# Patient Record
Sex: MALE | Race: ASIAN | NOT HISPANIC OR LATINO | Employment: FULL TIME | URBAN - METROPOLITAN AREA
[De-identification: names, ages, dates, MRNs, and addresses within clinical notes are randomized per-mention and may not be internally consistent; named-entity substitution may affect disease eponyms.]

---

## 2020-06-17 ENCOUNTER — OFFICE VISIT - HEALTHEAST (OUTPATIENT)
Dept: FAMILY MEDICINE | Facility: CLINIC | Age: 30
End: 2020-06-17

## 2020-06-17 DIAGNOSIS — R07.89 ATYPICAL CHEST PAIN: ICD-10-CM

## 2020-07-27 ENCOUNTER — OFFICE VISIT - HEALTHEAST (OUTPATIENT)
Dept: FAMILY MEDICINE | Facility: CLINIC | Age: 30
End: 2020-07-27

## 2020-07-27 DIAGNOSIS — Z00.00 PHYSICAL EXAM: ICD-10-CM

## 2020-07-27 DIAGNOSIS — R07.89 ATYPICAL CHEST PAIN: ICD-10-CM

## 2020-07-27 DIAGNOSIS — Z13.220 SCREENING FOR LIPID DISORDERS: ICD-10-CM

## 2020-07-27 DIAGNOSIS — Z23 NEED FOR VACCINATION: ICD-10-CM

## 2020-07-27 LAB
CHOLEST SERPL-MCNC: 226 MG/DL
FASTING STATUS PATIENT QL REPORTED: YES
FASTING STATUS PATIENT QL REPORTED: YES
GLUCOSE BLD-MCNC: 93 MG/DL (ref 70–125)
HDLC SERPL-MCNC: 47 MG/DL
LDLC SERPL CALC-MCNC: 162 MG/DL
TRIGL SERPL-MCNC: 85 MG/DL

## 2020-07-27 RX ORDER — IBUPROFEN 800 MG/1
TABLET, FILM COATED ORAL
Status: SHIPPED | COMMUNITY
Start: 2020-07-23 | End: 2023-08-17

## 2020-07-27 RX ORDER — LORATADINE 10 MG/1
TABLET ORAL
Status: SHIPPED | COMMUNITY
Start: 2020-07-23 | End: 2023-08-17

## 2020-07-27 RX ORDER — AMOXICILLIN 500 MG/1
CAPSULE ORAL
Status: SHIPPED | COMMUNITY
Start: 2020-07-23 | End: 2023-08-17

## 2020-07-27 ASSESSMENT — MIFFLIN-ST. JEOR: SCORE: 1913.25

## 2020-07-28 ENCOUNTER — COMMUNICATION - HEALTHEAST (OUTPATIENT)
Dept: FAMILY MEDICINE | Facility: CLINIC | Age: 30
End: 2020-07-28

## 2021-06-04 VITALS — WEIGHT: 190 LBS

## 2021-06-04 VITALS
HEIGHT: 69 IN | SYSTOLIC BLOOD PRESSURE: 138 MMHG | DIASTOLIC BLOOD PRESSURE: 90 MMHG | BODY MASS INDEX: 31.15 KG/M2 | HEART RATE: 73 BPM | WEIGHT: 210.3 LBS

## 2021-06-08 NOTE — PROGRESS NOTES
"Geovanni Shook is a 30 y.o. male who is being evaluated via a billable video visit.      The patient has been notified of following:     \"This video visit will be conducted via a call between you and your physician/provider. We have found that certain health care needs can be provided without the need for an in-person physical exam.  This service lets us provide the care you need with a video conversation.  If a prescription is necessary we can send it directly to your pharmacy.  If lab work is needed we can place an order for that and you can then stop by our lab to have the test done at a later time.    Video visits are billed at different rates depending on your insurance coverage. Please reach out to your insurance provider with any questions.    If during the course of the call the physician/provider feels a video visit is not appropriate, you will not be charged for this service.\"    Patient has given verbal consent to a Video visit? Yes    How would you like to obtain your AVS? AVS Preference: Mail a copy.  Patient would like the video invitation sent by: Other e-mail: [email or web address]    Will anyone else be joining your video visit? No        Video Start Time: 10:11 AM    Assessment/Plan:   1. Atypical chest pain  Patient history and exam consistent with non-cardiac cause of chest pain.  Conservative measures indicated. I suspect chest pressure likely secondary to acid reflux, recommend tying TUMS and adjusting food by reducing acidic food. Patient will follow up in a week if symptom persist or worsens.     Subjective:   Geovanni Shook is a 30 y.o. male who presents for evaluation of chest pain. Onset was 2.5 weeks ago. Symptoms have been unchanged since that time. The patient describes the pain as pressure and does not radiate. Patient rates pain as a 2/10 in intensity. Associated symptoms are: none. Aggravating factors are: any movement. Alleviating factors are: none. Patient's cardiac risk factors are: none. " Patient's risk factors for DVT/PE: none. Previous cardiac testing: none.    The following portions of the patient's history were reviewed and updated as appropriate: allergies, current medications, past family history, past medical history, past social history, past surgical history and problem list.    Review of Systems  A 12 point comprehensive review of systems was negative except as noted.      Additional provider notes: GENERAL: Healthy, alert and no distress  EYES: Eyes grossly normal to inspection. No discharge or erythema, or obvious scleral/conjunctival abnormalities.  RESP: No audible wheeze, cough, or visible cyanosis.  No visible retractions or increased work of breathing.    NEURO: Cranial nerves grossly intact. Mentation and speech appropriate for age.  PSYCH: Mentation appears normal, affect normal/bright, judgement and insight intact, normal speech and appearance well-groomed              Video-Visit Details    Type of service:  Video Visit    Video End Time (time video stopped): 10:24 AM  Originating Location (pt. Location): Home    Distant Location (provider location):  [facility]     Platform used for Video Visit: FAUZIA Prater

## 2021-06-16 PROBLEM — E78.5 HYPERLIPIDEMIA: Status: ACTIVE | Noted: 2020-07-28

## 2021-06-20 NOTE — LETTER
Letter by Oz Rodriguez MD at      Author: Oz Rodriguez MD Service: -- Author Type: --    Filed:  Encounter Date: 7/28/2020 Status: (Other)         Geovanni Shook  [address] [facility] [address]             July 28, 2020         Dear Mr. Shook,    Below are the results from your recent visit: Sugar is good at 93, no diabetes.  The total and LDL cholesterol are slightly elevated, but focus on good diet and exercise to help lower.    Resulted Orders   Glucose   Result Value Ref Range    Glucose 93 70 - 125 mg/dL    Patient Fasting > 8hrs? Yes     Narrative    Fasting Glucose reference range is 70-99 mg/dL per  American Diabetes Association (ADA) guidelines.   Lipid Cascade   Result Value Ref Range    Cholesterol 226 (H) <=199 mg/dL    Triglycerides 85 <=149 mg/dL    HDL Cholesterol 47 >=40 mg/dL    LDL Calculated 162 (H) <=129 mg/dL    Patient Fasting > 8hrs? Yes             Please call with questions or contact us using [email or web address].    Sincerely,        Electronically signed by Oz Rodriguez MD

## 2021-06-27 ENCOUNTER — HEALTH MAINTENANCE LETTER (OUTPATIENT)
Age: 31
End: 2021-06-27

## 2021-06-29 NOTE — PROGRESS NOTES
Progress Notes by Oz Rodriguez MD at 7/27/2020  2:30 PM     Author: Oz Rodriguez MD Service: -- Author Type: Physician    Filed: 7/27/2020  3:32 PM Encounter Date: 7/27/2020 Status: Signed    : Oz Rodriguez MD (Physician)       MALE PREVENTATIVE EXAM    Assessment and Plan:       1. Physical exam  The patient overall has very good health habits.    2. Atypical chest pain  4 to 6 weeks of intermittent recurrent diffuse anterior chest wall pain, I suspect a benign etiology    3. Need for vaccination     - Tdap vaccine,  6yo or older,  IM    4. Screening for lipid disorders  No family history of diabetes or lipid disorders  - Glucose  - Lipid Cascade    PLAN:  1.  Tdap  2.  Laboratory studies as above.  3.  Overall reassurance about the chest wall pain I suspect that this is benign and likely will resolve spontaneously.  4.  Patient should be seen every 1 to 2 years        Next follow up:  No follow-ups on file.    Immunization Review  Adult Imm Review: Due today, orders placed      I discussed the following with the patient:   Adult Healthy Living: Importance of regular exercise  Healthy nutrition        Subjective:   Chief Complaint: Geovanni Shook is an 30 y.o. male here for a preventative health visit.     HPI: 30-year-old male new to the clinic here for history and physical examination.    Patient reports for about 4 to 6 weeks he has had intermittent recurrent diffuse pain or tenderness may be pressure anterior chest wall left-sided but right-sided as well.  He had a virtual visit last month he was recommended to take Tums which he did take some but did not think that made really much of a difference.  There is no association between activity exercise diet really anything else that he can think of.  Patient reports that when he has this discomfort it does not in any way interfere with function, I told the patient I suspect some type of benign explanation or etiology.    Patient otherwise has very  "good health habits, he has never been a smoker drinks a few alcoholic beverages weekly he does have some regular exercise, he really has no underlying medical issues.  He is recently had some dental work.    Reviewed the patient's allergies, medications active medical problems past medical history past surgical history family history and social history.    Healthy Habits  Are you taking a daily aspirin? No  Do you typically exercising at least 40 min, 3-4 times per week?  Yes  Do you usually eat at least 4 servings of fruit and vegetables a day, include whole grains and fiber and avoid regularly eating high fat foods? Yes  Have you had an eye exam in the past two years? Yes  Do you see a dentist twice per year? Yes  Do you have any concerns regarding sleep? No    Safety Screen  If you own firearms, are they secured in a locked gun cabinet or with trigger locks? The patient does not own any firearms  Do you feel you are safe where you are living?: Yes (7/27/2020  2:23 PM)  Do you feel you are safe in your relationship(s)?: Yes (7/27/2020  2:23 PM)      Review of Systems:  Please see above.  The rest of the review of systems are negative for all systems.     Cancer Screening     Patient has no health maintenance due at this time          Patient Care Team:  Provider, No Primary Care as PCP - General        History     Reviewed By Date/Time Sections Reviewed    Oz Rodriguez MD 7/27/2020  2:40 PM Medical, Surgical, Tobacco, Alcohol, Drug Use, Sexual Activity, Family, Social Documentation, Socioeconomic, Lifestyle, Relationships    Brenda De Santiago CMA 7/27/2020  2:29 PM Tobacco            Objective:   Vital Signs:   Visit Vitals  /90   Pulse 73   Ht 5' 9.25\" (1.759 m)   Wt 210 lb 4.8 oz (95.4 kg)   BMI 30.83 kg/m           PHYSICAL EXAM  Physical Exam   Constitutional: He is oriented to person, place, and time. He appears well-developed and well-nourished.   HENT:   Head: Normocephalic and atraumatic. "   Right Ear: External ear normal.   Eyes: Pupils are equal, round, and reactive to light. Conjunctivae are normal.   Cardiovascular: Normal rate, regular rhythm and normal heart sounds.   Pulmonary/Chest: Effort normal and breath sounds normal.   Musculoskeletal: Normal range of motion.   Neurological: He is alert and oriented to person, place, and time.   Skin: Skin is warm and dry.   Psychiatric: He has a normal mood and affect. His behavior is normal. Judgment and thought content normal.                Medication List          Accurate as of July 27, 2020  2:58 PM. If you have any questions, ask your nurse or doctor.            CONTINUE taking these medications    amoxicillin 500 MG capsule  Also known as:  AMOXIL        Arthritis Pain Relief (acetam) 650 MG CR tablet  Generic drug:  acetaminophen        ibuprofen 800 MG tablet  Also known as:  ADVILMOTRIN               Additional Screenings Completed Today:

## 2021-10-17 ENCOUNTER — HEALTH MAINTENANCE LETTER (OUTPATIENT)
Age: 31
End: 2021-10-17

## 2022-01-24 ENCOUNTER — OFFICE VISIT (OUTPATIENT)
Dept: FAMILY MEDICINE | Facility: CLINIC | Age: 32
End: 2022-01-24
Payer: COMMERCIAL

## 2022-01-24 VITALS
WEIGHT: 215 LBS | DIASTOLIC BLOOD PRESSURE: 92 MMHG | SYSTOLIC BLOOD PRESSURE: 134 MMHG | OXYGEN SATURATION: 98 % | BODY MASS INDEX: 31.52 KG/M2 | HEART RATE: 64 BPM

## 2022-01-24 DIAGNOSIS — R19.8 PAIN ASSOCIATED WITH DEFECATION: Primary | ICD-10-CM

## 2022-01-24 PROCEDURE — 99213 OFFICE O/P EST LOW 20 MIN: CPT | Performed by: FAMILY MEDICINE

## 2022-01-24 RX ORDER — HYDROCORTISONE 25 MG/G
CREAM TOPICAL 2 TIMES DAILY PRN
Qty: 30 G | Refills: 1 | Status: SHIPPED | OUTPATIENT
Start: 2022-01-24 | End: 2023-08-31

## 2022-01-24 NOTE — PROGRESS NOTES
ASSESSMENT:  (R19.8) Pain associated with defecation  (primary encounter diagnosis)  Comment: I suspect this is related to both a small anal fissure, and some external hemorrhoids.  Plan: hydrocortisone, Perianal, (HYDROCORTISONE) 2.5         % cream                 PLAN:  1.  Continue supplemental fiber  2.  Add Anusol cream up to twice a day as needed discussed that I would want him to do this after each bowel movement  3.  If despite the above the patient is still having problems or difficulties with then consider referral to a general surgeon.  4.  Patient is due for a physical later in the year.       No orders of the defined types were placed in this encounter.    There are no discontinued medications.    No follow-ups on file.    CHIEF COMPLAINT:  chief complaint    SUBJECTIVE:  Geovanni is a 31 year old male who comes in because for about the past month or so he has had pain with and after defecation.  This is a new symptom for him.  This began when he was constipated but since that time he has not been constipated and he is taking some supplemental fiber.  He has not noticed any blood in her on his stool.    The patient otherwise has no other change in his health status, appetite remains good his diet has not changed.    I discussed with the patient that I suspect that he has a small anal fissure, and also I suspect that some hemorrhoids which I can note on examination are also playing a role and will try to manage this conservatively.      REVIEW OF SYSTEMS:      All other systems are negative.    PFSH:  Immunization History   Administered Date(s) Administered     COVID-19,PF,Pfizer (12+ Yrs) 09/22/2021, 10/13/2021     Tdap (Adacel,Boostrix) 07/27/2020     Social History     Socioeconomic History     Marital status: Single     Spouse name: Not on file     Number of children: 0     Years of education: Not on file     Highest education level: Not on file   Occupational History     Not on file   Tobacco Use      Smoking status: Never Smoker     Smokeless tobacco: Never Used   Substance and Sexual Activity     Alcohol use: Yes     Alcohol/week: 2.0 standard drinks     Drug use: Not on file     Sexual activity: Not on file   Other Topics Concern     Not on file   Social History Narrative    Diet-regular    Exercise-volleyball, run     Social Determinants of Health     Financial Resource Strain: Not on file   Food Insecurity: Not on file   Transportation Needs: Not on file   Physical Activity: Not on file   Stress: Not on file   Social Connections: Not on file   Intimate Partner Violence: Not on file   Housing Stability: Not on file     No past medical history on file.  No family history on file.    MEDICATIONS:  Current Outpatient Medications   Medication Sig Dispense Refill     amoxicillin (AMOXIL) 500 MG capsule [AMOXICILLIN (AMOXIL) 500 MG CAPSULE]        ARTHRITIS PAIN RELIEF, ACETAM, 650 mg CR tablet [ARTHRITIS PAIN RELIEF, ACETAM, 650 MG CR TABLET]        ibuprofen (ADVIL,MOTRIN) 800 MG tablet [IBUPROFEN (ADVIL,MOTRIN) 800 MG TABLET]          TOBACCO USE:  History   Smoking Status     Never Smoker   Smokeless Tobacco     Never Used       VITALS:  There were no vitals filed for this visit.  Wt Readings from Last 3 Encounters:   07/27/20 95.4 kg (210 lb 4.8 oz)   06/17/20 86.2 kg (190 lb)       PHYSICAL EXAM:  Constitutional:   Reveals a male who appears his stated age.  Vitals: per nursing notes.  Eyes:  EOMs full, PERRL.  Musculoskeletal: No peripheral swelling.  Neuro:  Alert and oriented. Cranial nerves, motor, sensory exams are intact.  No gross focal deficits.  Psychiatric:  Memory intact, mood appropriate.  Rectal examination.  There are some nonthrombosed just slightly tender external hemorrhoids that are not bleeding at this time, there is some reproducible tenderness with entry into the anal canal.    QUALITY MEASURES:      DATA REVIEWED:

## 2022-10-02 ENCOUNTER — HEALTH MAINTENANCE LETTER (OUTPATIENT)
Age: 32
End: 2022-10-02

## 2023-02-11 ENCOUNTER — HEALTH MAINTENANCE LETTER (OUTPATIENT)
Age: 33
End: 2023-02-11

## 2023-08-16 ASSESSMENT — PATIENT HEALTH QUESTIONNAIRE - PHQ9
10. IF YOU CHECKED OFF ANY PROBLEMS, HOW DIFFICULT HAVE THESE PROBLEMS MADE IT FOR YOU TO DO YOUR WORK, TAKE CARE OF THINGS AT HOME, OR GET ALONG WITH OTHER PEOPLE: VERY DIFFICULT
SUM OF ALL RESPONSES TO PHQ QUESTIONS 1-9: 4
SUM OF ALL RESPONSES TO PHQ QUESTIONS 1-9: 4

## 2023-08-17 ENCOUNTER — OFFICE VISIT (OUTPATIENT)
Dept: FAMILY MEDICINE | Facility: CLINIC | Age: 33
End: 2023-08-17
Payer: COMMERCIAL

## 2023-08-17 VITALS
RESPIRATION RATE: 19 BRPM | TEMPERATURE: 98 F | DIASTOLIC BLOOD PRESSURE: 77 MMHG | HEIGHT: 69 IN | WEIGHT: 225 LBS | OXYGEN SATURATION: 99 % | HEART RATE: 66 BPM | SYSTOLIC BLOOD PRESSURE: 118 MMHG | BODY MASS INDEX: 33.33 KG/M2

## 2023-08-17 DIAGNOSIS — Z11.59 NEED FOR HEPATITIS C SCREENING TEST: ICD-10-CM

## 2023-08-17 DIAGNOSIS — K64.4 EXTERNAL HEMORRHOIDS: ICD-10-CM

## 2023-08-17 DIAGNOSIS — Z11.4 SCREENING FOR HIV (HUMAN IMMUNODEFICIENCY VIRUS): Primary | ICD-10-CM

## 2023-08-17 PROCEDURE — 99213 OFFICE O/P EST LOW 20 MIN: CPT | Performed by: FAMILY MEDICINE

## 2023-08-17 RX ORDER — HYDROCORTISONE 25 MG/G
CREAM TOPICAL 2 TIMES DAILY PRN
Qty: 30 G | Refills: 1 | Status: SHIPPED | OUTPATIENT
Start: 2023-08-17 | End: 2023-09-21

## 2023-08-17 NOTE — PROGRESS NOTES
"  Assessment & Plan       (K64.4) External hemorrhoids  Comment: Symptomatic  Plan: hydrocortisone, Perianal, (HYDROCORTISONE) 2.5         % cream             PLAN:  Anusol cream restarted  Patient also will try to get adequate fiber from his diet but will add supplemental fiber as needed  Patient will be seen in the near future for physical             BMI:   Estimated body mass index is 32.99 kg/m  as calculated from the following:    Height as of this encounter: 1.759 m (5' 9.25\").    Weight as of this encounter: 102.1 kg (225 lb).           MD TONY [facility]    Mercedes Galarza is a 33 year old, presenting for the following health issues:  Patient comes in because of some rectal pain that he is having.  Of note I saw the patient actually for the same issue in January 2022 on exam he had an external hemorrhoid I treated this with Anusol cream which was successful, but in the last week or so he is having some pain with bowel movements but no bleeding.    Patient was tried some over-the-counter remedy or ointment though he has not found that as helpful or effective.  Patient does note that he eats a diet that is more rich in fruits vegetables he has more well-formed and not harder stools, his stools have been a bit harder and he has been adding some fiber.    I discussed with the patient that I will restart him on Anusol cream, try to get as much fiber from his diet but if necessary add dietary supplement        Rectal Pain for a week after bowel movement         8/17/2023     9:37 AM   Additional Questions   Roomed by LUZ Dinh       History of Present Illness       Reason for visit:  Hemorrhoids    He eats 0-1 servings of fruits and vegetables daily.He consumes 0 sweetened beverage(s) daily.He exercises with enough effort to increase his heart rate 30 to 60 minutes per day.  He exercises with enough effort to increase his heart rate 3 or less days per week. " "  He is taking medications regularly.               Review of Systems         Objective    /77   Pulse 66   Temp 98  F (36.7  C) (Temporal)   Resp 19   Ht 1.759 m (5' 9.25\")   Wt 102.1 kg (225 lb)   SpO2 99%   BMI 32.99 kg/m    Body mass index is 32.99 kg/m .  Physical Exam                         "

## 2023-08-30 ASSESSMENT — ENCOUNTER SYMPTOMS
SHORTNESS OF BREATH: 0
JOINT SWELLING: 0
WEAKNESS: 0
HEMATOCHEZIA: 0
COUGH: 0
PALPITATIONS: 0
PARESTHESIAS: 0
NERVOUS/ANXIOUS: 0
SORE THROAT: 0
HEARTBURN: 0
CONSTIPATION: 0
HEADACHES: 0
NAUSEA: 0
FEVER: 0
EYE PAIN: 0
DYSURIA: 0
MYALGIAS: 0
DIZZINESS: 0
ARTHRALGIAS: 1
DIARRHEA: 0
ABDOMINAL PAIN: 0
HEMATURIA: 0
CHILLS: 0
FREQUENCY: 0

## 2023-08-31 ENCOUNTER — OFFICE VISIT (OUTPATIENT)
Dept: FAMILY MEDICINE | Facility: CLINIC | Age: 33
End: 2023-08-31
Payer: COMMERCIAL

## 2023-08-31 VITALS
RESPIRATION RATE: 16 BRPM | BODY MASS INDEX: 33.62 KG/M2 | TEMPERATURE: 98.2 F | OXYGEN SATURATION: 99 % | HEART RATE: 64 BPM | DIASTOLIC BLOOD PRESSURE: 84 MMHG | HEIGHT: 69 IN | SYSTOLIC BLOOD PRESSURE: 112 MMHG | WEIGHT: 227 LBS

## 2023-08-31 DIAGNOSIS — H91.93 BILATERAL HEARING LOSS, UNSPECIFIED HEARING LOSS TYPE: ICD-10-CM

## 2023-08-31 DIAGNOSIS — Z00.00 PHYSICAL EXAM: ICD-10-CM

## 2023-08-31 DIAGNOSIS — K64.4 EXTERNAL HEMORRHOIDS: ICD-10-CM

## 2023-08-31 DIAGNOSIS — E78.2 MIXED HYPERLIPIDEMIA: ICD-10-CM

## 2023-08-31 DIAGNOSIS — Z11.4 SCREENING FOR HIV (HUMAN IMMUNODEFICIENCY VIRUS): Primary | ICD-10-CM

## 2023-08-31 DIAGNOSIS — Z11.59 NEED FOR HEPATITIS C SCREENING TEST: ICD-10-CM

## 2023-08-31 PROBLEM — H91.90 HEARING LOSS: Status: ACTIVE | Noted: 2023-08-31

## 2023-08-31 LAB
ALBUMIN SERPL BCG-MCNC: 4.6 G/DL (ref 3.5–5.2)
ALP SERPL-CCNC: 57 U/L (ref 40–129)
ALT SERPL W P-5'-P-CCNC: 53 U/L (ref 0–70)
ANION GAP SERPL CALCULATED.3IONS-SCNC: 13 MMOL/L (ref 7–15)
AST SERPL W P-5'-P-CCNC: 45 U/L (ref 0–45)
BILIRUB SERPL-MCNC: 0.8 MG/DL
BUN SERPL-MCNC: 13.9 MG/DL (ref 6–20)
CALCIUM SERPL-MCNC: 9.2 MG/DL (ref 8.6–10)
CHLORIDE SERPL-SCNC: 103 MMOL/L (ref 98–107)
CHOLEST SERPL-MCNC: 242 MG/DL
CREAT SERPL-MCNC: 1.09 MG/DL (ref 0.67–1.17)
DEPRECATED HCO3 PLAS-SCNC: 24 MMOL/L (ref 22–29)
GFR SERPL CREATININE-BSD FRML MDRD: >90 ML/MIN/1.73M2
GLUCOSE SERPL-MCNC: 97 MG/DL (ref 70–99)
HDLC SERPL-MCNC: 49 MG/DL
LDLC SERPL CALC-MCNC: 160 MG/DL
NONHDLC SERPL-MCNC: 193 MG/DL
POTASSIUM SERPL-SCNC: 4.1 MMOL/L (ref 3.4–5.3)
PROT SERPL-MCNC: 7.5 G/DL (ref 6.4–8.3)
SODIUM SERPL-SCNC: 140 MMOL/L (ref 136–145)
TRIGL SERPL-MCNC: 163 MG/DL

## 2023-08-31 PROCEDURE — 99395 PREV VISIT EST AGE 18-39: CPT | Performed by: FAMILY MEDICINE

## 2023-08-31 PROCEDURE — 80061 LIPID PANEL: CPT | Performed by: FAMILY MEDICINE

## 2023-08-31 PROCEDURE — 36415 COLL VENOUS BLD VENIPUNCTURE: CPT | Performed by: FAMILY MEDICINE

## 2023-08-31 PROCEDURE — 99213 OFFICE O/P EST LOW 20 MIN: CPT | Mod: 25 | Performed by: FAMILY MEDICINE

## 2023-08-31 PROCEDURE — 80053 COMPREHEN METABOLIC PANEL: CPT | Performed by: FAMILY MEDICINE

## 2023-08-31 ASSESSMENT — ENCOUNTER SYMPTOMS
MYALGIAS: 0
JOINT SWELLING: 0
PARESTHESIAS: 0
DYSURIA: 0
ARTHRALGIAS: 1
HEADACHES: 0
EYE PAIN: 0
HEARTBURN: 0
DIZZINESS: 0
NERVOUS/ANXIOUS: 0
HEMATURIA: 0
SORE THROAT: 0
WEAKNESS: 0
CHILLS: 0
DIARRHEA: 0
FREQUENCY: 0
SHORTNESS OF BREATH: 0
ABDOMINAL PAIN: 0
HEMATOCHEZIA: 0
CONSTIPATION: 0
FEVER: 0
COUGH: 0
PALPITATIONS: 0
NAUSEA: 0

## 2023-08-31 NOTE — LETTER
September 1, 2023      Geovanni Shook  [address] [place] [address]        Dear ,    We are writing to inform you of your test results. Sugar is good at 97, you do not have diabetes.  Liver and kidney tests are normal.  Just a slight elevation of cholesterol but focus on good diet to help control.    See us again in one year.        Resulted Orders   Comprehensive metabolic panel   Result Value Ref Range    Sodium 140 136 - 145 mmol/L    Potassium 4.1 3.4 - 5.3 mmol/L    Chloride 103 98 - 107 mmol/L    Carbon Dioxide (CO2) 24 22 - 29 mmol/L    Anion Gap 13 7 - 15 mmol/L    Urea Nitrogen 13.9 6.0 - 20.0 mg/dL    Creatinine 1.09 0.67 - 1.17 mg/dL    Calcium 9.2 8.6 - 10.0 mg/dL    Glucose 97 70 - 99 mg/dL    Alkaline Phosphatase 57 40 - 129 U/L    AST 45 0 - 45 U/L      Comment:      Reference intervals for this test were updated on 6/12/2023 to more accurately reflect our healthy population. There may be differences in the flagging of prior results with similar values performed with this method. Interpretation of those prior results can be made in the context of the updated reference intervals.    ALT 53 0 - 70 U/L      Comment:      Reference intervals for this test were updated on 6/12/2023 to more accurately reflect our healthy population. There may be differences in the flagging of prior results with similar values performed with this method. Interpretation of those prior results can be made in the context of the updated reference intervals.      Protein Total 7.5 6.4 - 8.3 g/dL    Albumin 4.6 3.5 - 5.2 g/dL    Bilirubin Total 0.8 <=1.2 mg/dL    GFR Estimate >90 >60 mL/min/1.73m2   Lipid panel reflex to direct LDL Fasting   Result Value Ref Range    Cholesterol 242 (H) <200 mg/dL    Triglycerides 163 (H) <150 mg/dL    Direct Measure HDL 49 >=40 mg/dL    LDL Cholesterol Calculated 160 (H) <=100 mg/dL    Non HDL Cholesterol 193 (H) <130 mg/dL    Narrative    Cholesterol  Desirable:  <200  mg/dL    Triglycerides  Normal:  Less than 150 mg/dL  Borderline High:  150-199 mg/dL  High:  200-499 mg/dL  Very High:  Greater than or equal to 500 mg/dL    Direct Measure HDL  Female:  Greater than or equal to 50 mg/dL   Male:  Greater than or equal to 40 mg/dL    LDL Cholesterol  Desirable:  <100mg/dL  Above Desirable:  100-129 mg/dL   Borderline High:  130-159 mg/dL   High:  160-189 mg/dL   Very High:  >= 190 mg/dL    Non HDL Cholesterol  Desirable:  130 mg/dL  Above Desirable:  130-159 mg/dL  Borderline High:  160-189 mg/dL  High:  190-219 mg/dL  Very High:  Greater than or equal to 220 mg/dL       If you have any questions or concerns, please call the clinic at the number listed above.       Sincerely,      Oz Rodriguez MD

## 2023-08-31 NOTE — PROGRESS NOTES
SUBJECTIVE:   CC: Geovanni is an 33 year old who presents for preventative health visit.       8/31/2023    10:28 AM   Additional Questions   Roomed by Kanika TENORIO       HPI:  Patient comes in for his annual physical examination.  I saw the patient recently for an exacerbation of his external hemorrhoids he used Anusol cream symptoms have really resolved he does not typically have issues with straining or constipation    He has had issues with external hemorrhoids going back quite a number of years and they do occasionally flareup.    Patient has a history of hyperlipidemia attempting to control with lifestyle    Patient has a genetic hearing loss, he has hearing aids in and they have been helpful.    Overall the patient has good health habits.    Typically the patient does not get regular vaccinations, usually does not get the flu shot, he is equivocal about the new COVID-vaccine when available.          Healthy Habits:     Getting at least 3 servings of Calcium per day:  Yes    Bi-annual eye exam:  Yes    Dental care twice a year:  Yes    Sleep apnea or symptoms of sleep apnea:  None    Diet:  Regular (no restrictions)    Frequency of exercise:  2-3 days/week    Duration of exercise:  30-45 minutes    Taking medications regularly:  Yes    Medication side effects:  Not applicable    Additional concerns today:  Yes               Have you ever done Advance Care Planning? (For example, a Health Directive, POLST, or a discussion with a medical provider or your loved ones about your wishes): No, advance care planning information given to patient to review.  Advanced care planning was discussed at today's visit.    Social History     Tobacco Use    Smoking status: Never    Smokeless tobacco: Never   Substance Use Topics    Alcohol use: Yes     Alcohol/week: 2.0 standard drinks of alcohol             8/30/2023     1:11 PM   Alcohol Use   Prescreen: >3 drinks/day or >7 drinks/week? No       Last PSA: No results found for:  PSA    Reviewed orders with patient. Reviewed health maintenance and updated orders accordingly - Yes  Lab work is in process    Reviewed and updated as needed this visit by clinical staff   Tobacco  Allergies  Meds              Reviewed and updated as needed this visit by Provider                 History reviewed. No pertinent past medical history.   History reviewed. No pertinent surgical history.    Review of Systems   Constitutional:  Negative for chills and fever.   HENT:  Negative for congestion, ear pain, hearing loss and sore throat.    Eyes:  Negative for pain and visual disturbance.   Respiratory:  Negative for cough and shortness of breath.    Cardiovascular:  Negative for chest pain, palpitations and peripheral edema.   Gastrointestinal:  Negative for abdominal pain, constipation, diarrhea, heartburn, hematochezia and nausea.   Genitourinary:  Negative for dysuria, frequency, genital sores, hematuria, impotence, penile discharge and urgency.   Musculoskeletal:  Positive for arthralgias. Negative for joint swelling and myalgias.   Skin:  Negative for rash.   Neurological:  Negative for dizziness, weakness, headaches and paresthesias.   Psychiatric/Behavioral:  Negative for mood changes. The patient is not nervous/anxious.      CONSTITUTIONAL: NEGATIVE for fever, chills, change in weight  INTEGUMENTARY/SKIN: NEGATIVE for worrisome rashes, moles or lesions  EYES: NEGATIVE for vision changes or irritation  ENT: NEGATIVE for ear, mouth and throat problems  RESP: NEGATIVE for significant cough or SOB  CV: NEGATIVE for chest pain, palpitations or peripheral edema  GI: NEGATIVE for nausea, abdominal pain, heartburn, or change in bowel habits   male: negative for dysuria, hematuria, decreased urinary stream, erectile dysfunction, urethral discharge  MUSCULOSKELETAL: NEGATIVE for significant arthralgias or myalgia  NEURO: NEGATIVE for weakness, dizziness or paresthesias  PSYCHIATRIC: NEGATIVE for changes in  "mood or affect    OBJECTIVE:   /84   Pulse 64   Temp 98.2  F (36.8  C) (Temporal)   Resp 16   Ht 1.759 m (5' 9.25\")   Wt 103 kg (227 lb)   SpO2 99%   BMI 33.28 kg/m      Physical Exam  GENERAL: healthy, alert and no distress  EYES: Eyes grossly normal to inspection, PERRL and conjunctivae and sclerae normal  HENT: ear canals and TM's normal, nose and mouth without ulcers or lesions  NECK: no adenopathy, no asymmetry, masses, or scars and thyroid normal to palpation  RESP: lungs clear to auscultation - no rales, rhonchi or wheezes  CV: regular rate and rhythm, normal S1 S2, no S3 or S4, no murmur, click or rub, no peripheral edema and peripheral pulses strong  ABDOMEN: soft, nontender, no hepatosplenomegaly, no masses and bowel sounds normal  MS: no gross musculoskeletal defects noted, no edema  SKIN: no suspicious lesions or rashes  NEURO: Normal strength and tone, mentation intact and speech normal  PSYCH: mentation appears normal, affect normal/bright    Diagnostic Test Results:  Labs reviewed in Epic    ASSESSMENT/PLAN:       (Z00.00) Physical exam  Comment: Overall the patient has good health habits and is in good health  Plan: PRIMARY CARE FOLLOW-UP SCHEDULING             (E78.2) Hyperlipidemia  Comment: Elevated, attempting to control with lifestyle  Plan: Comprehensive metabolic panel, Lipid panel         reflex to direct LDL Fasting             (K64.4) External hemorrhoids  Comment: Chronic longstanding occasional exacerbation Anusol cream as needed  Plan:      (H91.93) Bilateral hearing loss  Comment: Genetic basis has hearing aids in  Plan:      PLAN:  Laboratory studies as above  Patient otherwise to maintain healthy habits and should be seen yearly          COUNSELING:   Reviewed preventive health counseling, as reflected in patient instructions       Regular exercise       Healthy diet/nutrition      BMI:   Estimated body mass index is 33.28 kg/m  as calculated from the following:    Height " "as of this encounter: 1.759 m (5' 9.25\").    Weight as of this encounter: 103 kg (227 lb).   Weight management plan: Discussed healthy diet and exercise guidelines      He reports that he has never smoked. He has never used smokeless tobacco.            Oz Rodriguez MD  [facility]  "

## 2023-09-21 ENCOUNTER — OFFICE VISIT (OUTPATIENT)
Dept: FAMILY MEDICINE | Facility: CLINIC | Age: 33
End: 2023-09-21
Payer: COMMERCIAL

## 2023-09-21 VITALS
DIASTOLIC BLOOD PRESSURE: 84 MMHG | TEMPERATURE: 98 F | HEART RATE: 63 BPM | BODY MASS INDEX: 34.07 KG/M2 | HEIGHT: 69 IN | RESPIRATION RATE: 16 BRPM | OXYGEN SATURATION: 98 % | SYSTOLIC BLOOD PRESSURE: 130 MMHG | WEIGHT: 230 LBS

## 2023-09-21 DIAGNOSIS — Z23 NEED FOR VACCINATION: ICD-10-CM

## 2023-09-21 DIAGNOSIS — R21 GROIN RASH: ICD-10-CM

## 2023-09-21 DIAGNOSIS — K64.4 EXTERNAL HEMORRHOIDS: Primary | ICD-10-CM

## 2023-09-21 PROCEDURE — 90686 IIV4 VACC NO PRSV 0.5 ML IM: CPT | Performed by: FAMILY MEDICINE

## 2023-09-21 PROCEDURE — 99213 OFFICE O/P EST LOW 20 MIN: CPT | Mod: 25 | Performed by: FAMILY MEDICINE

## 2023-09-21 PROCEDURE — 90471 IMMUNIZATION ADMIN: CPT | Performed by: FAMILY MEDICINE

## 2023-09-21 RX ORDER — HYDROCORTISONE 25 MG/G
CREAM TOPICAL 2 TIMES DAILY PRN
Qty: 30 G | Refills: 1 | Status: SHIPPED | OUTPATIENT
Start: 2023-09-21

## 2023-09-21 NOTE — PROGRESS NOTES
Assessment & Plan     (K64.4) External hemorrhoids  (primary encounter diagnosis)  Comment: Patient with symptomatic external hemorrhoids, conservative management  Plan: hydrocortisone, Perianal, (HYDROCORTISONE) 2.5         % cream              (Z23) Need for vaccination  Comment:    Plan: INFLUENZA VACCINE IM > 6 MONTHS VALENT IIV4         (AFLURIA/FLUZONE)             (R21) Groin rash  Comment: Consistent with a tinea infection  Plan:      PLAN:  Influenza vaccine  Continue and renew the Anusol cream I would manage this conservatively at least for now  For the presumed tinea infection I recommend an over-the-counter antifungal cream to apply twice daily and give this at least several weeks  Patient is due for a physical in 1 year but he will be seen for the above issues as needed                 Oz Rodriguez MD  [facility]   Geovanni is a 33 year old, presenting for the following health issues:  Patient comes in with 2 distinct concerns.  I saw the patient about a month ago for among other things external hemorrhoids and started him on Anusol cream, he is been using it fairly regularly it is helpful he does think the hemorrhoids are better but needs a refill.    Patient has however noticed since that visit about a month ago that he had some itching in the scrotal area in the groin area of note I do see a red rash in the inguinal folds I told the patient that I actually suspect that this is an unrelated issue that is a fungal infection and I think this needs a different treatment.        Itching in groin area        9/21/2023     8:39 AM   Additional Questions   Roomed by Kanika TENORIO       History of Present Illness       Reason for visit:  Itching around groin and scrotum  Symptom onset:  3-7 days ago  Symptoms include:  Itchy  Symptom intensity:  Severe  Symptom progression:  Staying the same  Had these symptoms before:  No    He eats 2-3 servings of fruits and  "vegetables daily.He consumes 1 sweetened beverage(s) daily.He exercises with enough effort to increase his heart rate 20 to 29 minutes per day.  He exercises with enough effort to increase his heart rate 3 or less days per week.   He is taking medications regularly.                 Review of Systems         Objective    /84   Pulse 63   Temp 98  F (36.7  C) (Temporal)   Resp 16   Ht 1.759 m (5' 9.25\")   Wt 104.3 kg (230 lb)   SpO2 98%   BMI 33.72 kg/m    Body mass index is 33.72 kg/m .  Physical Exam   Rectal examination.  I can see some obvious visible external hemorrhoids.  Groin examination there is a slight reddish contiguous rash in both inguinal folds.                            "

## 2023-10-03 ENCOUNTER — DOCUMENTATION ONLY (OUTPATIENT)
Dept: OTHER | Facility: CLINIC | Age: 33
End: 2023-10-03
Payer: COMMERCIAL

## 2024-10-05 ENCOUNTER — HEALTH MAINTENANCE LETTER (OUTPATIENT)
Age: 34
End: 2024-10-05